# Patient Record
Sex: MALE | Race: WHITE | Employment: FULL TIME | URBAN - METROPOLITAN AREA
[De-identification: names, ages, dates, MRNs, and addresses within clinical notes are randomized per-mention and may not be internally consistent; named-entity substitution may affect disease eponyms.]

---

## 2022-04-14 ENCOUNTER — OFFICE VISIT (OUTPATIENT)
Dept: FAMILY MEDICINE CLINIC | Age: 37
End: 2022-04-14
Payer: COMMERCIAL

## 2022-04-14 VITALS
BODY MASS INDEX: 28.85 KG/M2 | HEIGHT: 70 IN | DIASTOLIC BLOOD PRESSURE: 82 MMHG | RESPIRATION RATE: 16 BRPM | HEART RATE: 80 BPM | SYSTOLIC BLOOD PRESSURE: 118 MMHG | OXYGEN SATURATION: 98 % | TEMPERATURE: 98 F | WEIGHT: 201.5 LBS

## 2022-04-14 DIAGNOSIS — R73.01 IFG (IMPAIRED FASTING GLUCOSE): ICD-10-CM

## 2022-04-14 DIAGNOSIS — Z13.220 SCREENING FOR LIPID DISORDERS: ICD-10-CM

## 2022-04-14 DIAGNOSIS — H01.02B SQUAMOUS BLEPHARITIS OF UPPER AND LOWER EYELIDS OF BOTH EYES: ICD-10-CM

## 2022-04-14 DIAGNOSIS — Z76.89 ENCOUNTER TO ESTABLISH CARE: Primary | ICD-10-CM

## 2022-04-14 DIAGNOSIS — H01.02A SQUAMOUS BLEPHARITIS OF UPPER AND LOWER EYELIDS OF BOTH EYES: ICD-10-CM

## 2022-04-14 DIAGNOSIS — M67.432 GANGLION CYST OF DORSUM OF LEFT WRIST: ICD-10-CM

## 2022-04-14 PROBLEM — G44.209 TENSION-TYPE HEADACHE, NOT INTRACTABLE: Status: ACTIVE | Noted: 2017-07-26

## 2022-04-14 PROCEDURE — 1036F TOBACCO NON-USER: CPT | Performed by: NURSE PRACTITIONER

## 2022-04-14 PROCEDURE — 99203 OFFICE O/P NEW LOW 30 MIN: CPT | Performed by: NURSE PRACTITIONER

## 2022-04-14 PROCEDURE — G8419 CALC BMI OUT NRM PARAM NOF/U: HCPCS | Performed by: NURSE PRACTITIONER

## 2022-04-14 PROCEDURE — G8427 DOCREV CUR MEDS BY ELIG CLIN: HCPCS | Performed by: NURSE PRACTITIONER

## 2022-04-14 SDOH — ECONOMIC STABILITY: FOOD INSECURITY: WITHIN THE PAST 12 MONTHS, THE FOOD YOU BOUGHT JUST DIDN'T LAST AND YOU DIDN'T HAVE MONEY TO GET MORE.: NEVER TRUE

## 2022-04-14 SDOH — ECONOMIC STABILITY: FOOD INSECURITY: WITHIN THE PAST 12 MONTHS, YOU WORRIED THAT YOUR FOOD WOULD RUN OUT BEFORE YOU GOT MONEY TO BUY MORE.: NEVER TRUE

## 2022-04-14 ASSESSMENT — PATIENT HEALTH QUESTIONNAIRE - PHQ9
SUM OF ALL RESPONSES TO PHQ QUESTIONS 1-9: 0
1. LITTLE INTEREST OR PLEASURE IN DOING THINGS: 0
2. FEELING DOWN, DEPRESSED OR HOPELESS: 0
SUM OF ALL RESPONSES TO PHQ9 QUESTIONS 1 & 2: 0

## 2022-04-14 ASSESSMENT — ENCOUNTER SYMPTOMS
TROUBLE SWALLOWING: 0
SINUS PRESSURE: 0
DIARRHEA: 0
ABDOMINAL PAIN: 0
NAUSEA: 0
SORE THROAT: 0
VOICE CHANGE: 0
BACK PAIN: 0
RHINORRHEA: 0
COLOR CHANGE: 0
WHEEZING: 0
SHORTNESS OF BREATH: 0
EYE DISCHARGE: 1
FACIAL SWELLING: 0
CONSTIPATION: 0
CHEST TIGHTNESS: 0
COUGH: 0
SINUS PAIN: 0
VOMITING: 0

## 2022-04-14 ASSESSMENT — SOCIAL DETERMINANTS OF HEALTH (SDOH): HOW HARD IS IT FOR YOU TO PAY FOR THE VERY BASICS LIKE FOOD, HOUSING, MEDICAL CARE, AND HEATING?: NOT HARD AT ALL

## 2022-04-14 NOTE — PROGRESS NOTES
NEW PATIENT PROGRESS NOTE  [facility] [address]  [address] [address]  Dept: [phone]   Chief Complaint   Patient presents with   [facility].     Has not been seen by  in 4 years: possible eye infection: crusties in both eyes when waking up: Since January when had covid: Red and crusty no itchy       ASSESSMENT/PLAN   1. Encounter to establish care  2. Squamous blepharitis of upper and lower eyelids of both eyes  Assessment & Plan:   New at this time no infection would recommend using baby shampoo and clean the upper and lower eyelids twice a day and if worsens to let me know. Wear eye protection during working hours. 3. Ganglion cyst of dorsum of left wrist  Assessment & Plan:   Asymptomatic at this time would not recommend removal.   4. Screening for lipid disorders  -     Lipid Panel; Future  5. IFG (impaired fasting glucose)  -     CBC with Auto Differential; Future  -     Comprehensive Metabolic Panel; Future  -     Hemoglobin A1C; Future           Discussed reducing caffeine intake and Discussed taking medications as directed and adverse effects    · Reach and stay at a healthy weight. This will lower your risk for many problems, such as obesity, diabetes, heart disease, and high blood pressure. · Get at least 30 minutes of exercise on most days of the week. Walking is a good choice. You also may want to do other activities, such as running, swimming, cycling, or playing tennis or team sports. · Do not smoke. Smoking can make health problems worse. If you need help quitting, talk to your doctor about stop-smoking programs and medicines. These can increase your chances of quitting for good. · Protect your skin from too much sun. When you're outdoors from 10 a.m. to 4 p.m., stay in the shade or cover up with clothing and a hat with a wide brim. Wear sunglasses that block UV rays.  Even when it's cloudy, put broad-spectrum sunscreen (SPF 30 or higher) on any exposed skin. · See a dentist one or two times a year for checkups and to have your teeth cleaned. · Wear a seat belt in the car. · Limit alcohol to 1 drink a day. Too much alcohol can cause health problems. Return in about 1 year (around 4/14/2023), or if symptoms worsen or fail to improve, for Annual exam.         I have reviewed my findings and recommendations with Tony Mares. Chad Friend, APRN - CNP, NP-C, FNP-BC        HPI:     Here to establish care was seeing Dr Jarret Acuña last visit 4 years ago    Last dental exam 6 months, last eye exam 2 weeks ago, last colonoscopy DR Moreno 3 years ago due to [place] of colon cancer in his mom in her late 42's. He had Covid pneumonia in January 2022 and he was admitted it has taken him a couple of months to recover. He is having crusty eyes in the mornings since he had Covid in January. 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 He has had problems in the past and was told to see his PCP by the eye Dr.   He would get steroid injection when the allergies were bad. He also have a cyst on the left wrist x 2 years, not painful and not getting bigger. Allergies   Allergen Reactions    Metronidazole Swelling    Pcn [Penicillins]       No current outpatient medications on file. Past Medical History:   Diagnosis Date    Allergic rhinitis     Back injury herniated disc     Chronic back pain     sciatica    COVID-19 01/2022    Intractable migraine without aura and without status migrainosus 9/28/2015      History reviewed. No pertinent surgical history.    Family History   Problem Relation Age of Onset    Leukemia Mother         Acute T Cell    Colon Cancer Mother 50    Leukemia Brother         Acute T Cell    Leukemia Nephew         Acute T Cell    COPD Father     Breast Cancer Maternal Grandmother     Heart Disease Maternal Grandmother     Breast Cancer Paternal Grandmother       Social History     Socioeconomic History  Marital status:      Spouse name: None    Number of children: None    Years of education: None    Highest education level: None   Occupational History    None   Tobacco Use    Smoking status: Former Smoker     Years: 17.00     Types: Cigarettes     Start date:      Quit date:      Years since quittin.2    Smokeless tobacco: Former User   Vaping Use    Vaping Use: Never used   Substance and Sexual Activity    Alcohol use: Yes     Comment: social    Drug use: No    Sexual activity: Yes     Partners: Female   Other Topics Concern    None   Social History Narrative    None     Social Determinants of Health     Financial Resource Strain: Low Risk     Difficulty of Paying Living Expenses: Not hard at all   Food Insecurity: No Food Insecurity    Worried About [address] in the Last Year: Never true    [address] in the Last Year: Never true   Transportation Needs:     Lack of Transportation (Medical): Not on file    Lack of Transportation (Non-Medical):  Not on file   Physical Activity:     Days of Exercise per Week: Not on file    Minutes of Exercise per Session: Not on file   Stress:     Feeling of Stress : Not on file   Social Connections:     Frequency of Communication with Friends and Family: Not on file    Frequency of Social Gatherings with Friends and Family: Not on file    Attends Taoist Services: Not on file    Active Member of [address] [place] [address] or Organizations: Not on file    Attends Club or Organization Meetings: Not on file    Marital Status: Not on file   Intimate Partner Violence:     Fear of Current or Ex-Partner: Not on file    Emotionally Abused: Not on file    Physically Abused: Not on file    Sexually Abused: Not on file   Housing Stability:     Unable to Pay for Housing in the Last Year: Not on file    Number of [place] in the Last Year: Not on file    Unstable Housing in the Last Year: Not on file         I have reviewed Norma allergies, medications, problem list, medical, social and family history and have updated as needed in the electronic medical record    Review of Systems   Constitutional: Negative for activity change, appetite change, chills, diaphoresis, fatigue, fever and unexpected weight change. HENT: Negative for congestion, dental problem, drooling, ear discharge, ear pain, facial swelling, hearing loss, mouth sores, nosebleeds, postnasal drip, rhinorrhea, sinus pressure, sinus pain, sneezing, sore throat, tinnitus, trouble swallowing and voice change. Eyes: Positive for discharge. Negative for visual disturbance. Respiratory: Negative for cough, chest tightness, shortness of breath and wheezing. Cardiovascular: Negative for chest pain, palpitations and leg swelling. Gastrointestinal: Negative for abdominal pain, constipation, diarrhea, nausea and vomiting. Endocrine: Negative for cold intolerance, heat intolerance, polydipsia, polyphagia and polyuria. Genitourinary: Negative for difficulty urinating, frequency and urgency. Musculoskeletal: Negative for arthralgias, back pain, gait problem, joint swelling, myalgias, neck pain and neck stiffness. Skin: Negative for color change, pallor, rash and wound. Allergic/Immunologic: Negative for environmental allergies, food allergies and immunocompromised state. Neurological: Negative for dizziness, tremors, seizures, syncope, facial asymmetry, speech difficulty, weakness, light-headedness, numbness and headaches. Hematological: Negative for adenopathy. Does not bruise/bleed easily. Psychiatric/Behavioral: Negative for agitation, behavioral problems, confusion, decreased concentration, dysphoric mood, hallucinations, self-injury, sleep disturbance and suicidal ideas. The patient is not nervous/anxious and is not hyperactive.         OBJECTIVE:     VS:  Wt Readings from Last 3 Encounters:   04/14/22 201 lb 8 oz (91.4 kg)                       Vitals: 04/14/22 1317   BP: 118/82   Site: Left Upper Arm   Position: Sitting   Cuff Size: Medium Adult   Pulse: 80   Resp: 16   Temp: 98 °F (36.7 °C)   TempSrc: Temporal   SpO2: 98%   Weight: 201 lb 8 oz (91.4 kg)   Height: 5' 10\" (1.778 m)       General: Alert and oriented to person, place, and time, well developed and well nourished, in no acute distress  SKIN: Warm and dry, intact without any rash, masses or lesions  HEAD: normocephalic, atraumatic  Eyes: sclera/conjunctiva clear small crust noted on the upper lid, PERRLA, EOMI's intact  ENT: tympanic membranes, external ear and ear canal normal bilaterally, normal hearing, Nose without deformity, nasal mucosa and turbinates normal without polyps   Throat: clear, tongue midline, tonsils 1+, drainage, no masses or lesions noted, good dentition  Neck: supple and non-tender without mass, trachea midline, no cervical lymphadenopathy, no bruit, no thyromegaly or nodules  Cardiovascular: regular rate and regular rhythm, normal S1 and S2, no murmurs, rubs, clicks, or gallop. Distal pulses intact, no carotid bruits. No edema  Pulmonary/Chest: clear to auscultation bilaterally, no wheezes, rales or rhonchi, normal air movement, no respiratory distress  Abdomen: soft, non-tender, non-distended, normal bowel sounds, no masses or hepatosplenomegaly  Musculoskeletal: Normal ROM, no joint swelling, deformity or tenderness   Neurologic: reflexes normal and symmetric, no cranial nerve deficit, gait, coordination and speech normal  Extremities: no clubbing, cyanosis, or edema. Psychiatric: Good eye contact, normal mood and affect, answers questions appropriately      I have reviewed my findings and recommendations with Erna Beasley.     April Kelley, APRN - CNP, NP-C, FNP-BC

## 2022-04-14 NOTE — ASSESSMENT & PLAN NOTE
New at this time no infection would recommend using baby shampoo and clean the upper and lower eyelids twice a day and if worsens to let me know. Wear eye protection during working hours.

## 2022-04-14 NOTE — PATIENT INSTRUCTIONS
seek immediate medical care if:     You have signs of infection, such as:  ? Increased pain, swelling, warmth, or redness. ? Red streaks leading from the cyst.  ? Pus draining from the cyst.  ? A fever. Watch closely for changes in your health, and be sure to contact your doctor if:     You have increasing pain.      Your ganglion is getting larger.      You still have pain or numbness from a ganglion. Where can you learn more? Go to https://Qui.ltpeInboxQeb.WePow. org and sign in to your [employer] account. Enter I299 in the Promuc box to learn more about \"Ganglions: Care Instructions. \"     If you do not have an account, please click on the \"Sign Up Now\" link. Current as of: July 1, 2021               Content Version: 13.2  © 4062-3772 Healthwise, Incorporated. Care instructions adapted under license by [facility] ([facility]). If you have questions about a medical condition or this instruction, always ask your healthcare professional. [place] [address] any warranty or liability for your use of this information.

## 2022-04-21 DIAGNOSIS — R73.01 IFG (IMPAIRED FASTING GLUCOSE): ICD-10-CM

## 2022-04-21 DIAGNOSIS — Z13.220 SCREENING FOR LIPID DISORDERS: ICD-10-CM

## 2022-04-21 LAB
ALBUMIN SERPL-MCNC: 4.7 G/DL (ref 3.5–5.2)
ALP BLD-CCNC: 54 U/L (ref 40–129)
ALT SERPL-CCNC: 23 U/L (ref 0–40)
ANION GAP SERPL CALCULATED.3IONS-SCNC: 13 MMOL/L (ref 7–16)
AST SERPL-CCNC: 23 U/L (ref 0–39)
BASOPHILS ABSOLUTE: 0.03 E9/L (ref 0–0.2)
BASOPHILS RELATIVE PERCENT: 0.6 % (ref 0–2)
BILIRUB SERPL-MCNC: 0.7 MG/DL (ref 0–1.2)
BUN BLDV-MCNC: 10 MG/DL (ref 6–20)
CALCIUM SERPL-MCNC: 9.8 MG/DL (ref 8.6–10.2)
CHLORIDE BLD-SCNC: 102 MMOL/L (ref 98–107)
CHOLESTEROL, TOTAL: 190 MG/DL (ref 0–199)
CO2: 27 MMOL/L (ref 22–29)
CREAT SERPL-MCNC: 0.9 MG/DL (ref 0.7–1.2)
EOSINOPHILS ABSOLUTE: 0.25 E9/L (ref 0.05–0.5)
EOSINOPHILS RELATIVE PERCENT: 5.3 % (ref 0–6)
GFR AFRICAN AMERICAN: >60
GFR NON-AFRICAN AMERICAN: >60 ML/MIN/1.73
GLUCOSE BLD-MCNC: 87 MG/DL (ref 74–99)
HBA1C MFR BLD: 4.9 % (ref 4–5.6)
HCT VFR BLD CALC: 49.4 % (ref 37–54)
HDLC SERPL-MCNC: 53 MG/DL
HEMOGLOBIN: 17.2 G/DL (ref 12.5–16.5)
IMMATURE GRANULOCYTES #: 0.01 E9/L
IMMATURE GRANULOCYTES %: 0.2 % (ref 0–5)
LDL CHOLESTEROL CALCULATED: 121 MG/DL (ref 0–99)
LYMPHOCYTES ABSOLUTE: 1.24 E9/L (ref 1.5–4)
LYMPHOCYTES RELATIVE PERCENT: 26.3 % (ref 20–42)
MCH RBC QN AUTO: 30.8 PG (ref 26–35)
MCHC RBC AUTO-ENTMCNC: 34.8 % (ref 32–34.5)
MCV RBC AUTO: 88.5 FL (ref 80–99.9)
MONOCYTES ABSOLUTE: 0.45 E9/L (ref 0.1–0.95)
MONOCYTES RELATIVE PERCENT: 9.6 % (ref 2–12)
NEUTROPHILS ABSOLUTE: 2.73 E9/L (ref 1.8–7.3)
NEUTROPHILS RELATIVE PERCENT: 58 % (ref 43–80)
PDW BLD-RTO: 12.7 FL (ref 11.5–15)
PLATELET # BLD: 258 E9/L (ref 130–450)
PMV BLD AUTO: 9.6 FL (ref 7–12)
POTASSIUM SERPL-SCNC: 4.7 MMOL/L (ref 3.5–5)
RBC # BLD: 5.58 E12/L (ref 3.8–5.8)
SODIUM BLD-SCNC: 142 MMOL/L (ref 132–146)
TOTAL PROTEIN: 7.5 G/DL (ref 6.4–8.3)
TRIGL SERPL-MCNC: 82 MG/DL (ref 0–149)
VLDLC SERPL CALC-MCNC: 16 MG/DL
WBC # BLD: 4.7 E9/L (ref 4.5–11.5)

## 2022-04-22 DIAGNOSIS — R79.89 ABNORMAL CBC: Primary | ICD-10-CM

## 2022-06-30 PROBLEM — Z98.890 HX OF COLONOSCOPY: Status: ACTIVE | Noted: 2022-06-30

## 2022-10-17 DIAGNOSIS — R79.89 ABNORMAL CBC: ICD-10-CM

## 2022-10-17 LAB
BASOPHILS ABSOLUTE: 0.02 E9/L (ref 0–0.2)
BASOPHILS RELATIVE PERCENT: 0.4 % (ref 0–2)
EOSINOPHILS ABSOLUTE: 0.24 E9/L (ref 0.05–0.5)
EOSINOPHILS RELATIVE PERCENT: 4.6 % (ref 0–6)
HCT VFR BLD CALC: 48.4 % (ref 37–54)
HEMOGLOBIN: 16.8 G/DL (ref 12.5–16.5)
IMMATURE GRANULOCYTES #: 0 E9/L
IMMATURE GRANULOCYTES %: 0 % (ref 0–5)
LYMPHOCYTES ABSOLUTE: 1.07 E9/L (ref 1.5–4)
LYMPHOCYTES RELATIVE PERCENT: 20.5 % (ref 20–42)
MCH RBC QN AUTO: 31.1 PG (ref 26–35)
MCHC RBC AUTO-ENTMCNC: 34.7 % (ref 32–34.5)
MCV RBC AUTO: 89.5 FL (ref 80–99.9)
MONOCYTES ABSOLUTE: 0.52 E9/L (ref 0.1–0.95)
MONOCYTES RELATIVE PERCENT: 10 % (ref 2–12)
NEUTROPHILS ABSOLUTE: 3.36 E9/L (ref 1.8–7.3)
NEUTROPHILS RELATIVE PERCENT: 64.5 % (ref 43–80)
PDW BLD-RTO: 12.5 FL (ref 11.5–15)
PLATELET # BLD: 261 E9/L (ref 130–450)
PMV BLD AUTO: 9.9 FL (ref 7–12)
RBC # BLD: 5.41 E12/L (ref 3.8–5.8)
WBC # BLD: 5.2 E9/L (ref 4.5–11.5)

## 2023-04-11 DIAGNOSIS — R73.01 IFG (IMPAIRED FASTING GLUCOSE): ICD-10-CM

## 2023-04-11 DIAGNOSIS — Z13.220 SCREENING FOR LIPID DISORDERS: ICD-10-CM

## 2023-04-11 LAB
ALBUMIN SERPL-MCNC: 4.5 G/DL (ref 3.5–5.2)
ALP SERPL-CCNC: 65 U/L (ref 40–129)
ALT SERPL-CCNC: 23 U/L (ref 0–40)
ANION GAP SERPL CALCULATED.3IONS-SCNC: 16 MMOL/L (ref 7–16)
AST SERPL-CCNC: 26 U/L (ref 0–39)
BASOPHILS # BLD: 0.03 E9/L (ref 0–0.2)
BASOPHILS NFR BLD: 0.6 % (ref 0–2)
BILIRUB SERPL-MCNC: 0.7 MG/DL (ref 0–1.2)
BUN SERPL-MCNC: 8 MG/DL (ref 6–20)
CALCIUM SERPL-MCNC: 10.1 MG/DL (ref 8.6–10.2)
CHLORIDE SERPL-SCNC: 104 MMOL/L (ref 98–107)
CHOLESTEROL, TOTAL: 226 MG/DL (ref 0–199)
CO2 SERPL-SCNC: 25 MMOL/L (ref 22–29)
CREAT SERPL-MCNC: 0.9 MG/DL (ref 0.7–1.2)
EOSINOPHIL # BLD: 0.23 E9/L (ref 0.05–0.5)
EOSINOPHIL NFR BLD: 4.7 % (ref 0–6)
ERYTHROCYTE [DISTWIDTH] IN BLOOD BY AUTOMATED COUNT: 13.2 FL (ref 11.5–15)
GLUCOSE SERPL-MCNC: 87 MG/DL (ref 74–99)
HBA1C MFR BLD: 5.2 % (ref 4–5.6)
HCT VFR BLD AUTO: 51.1 % (ref 37–54)
HDLC SERPL-MCNC: 55 MG/DL
HGB BLD-MCNC: 16.9 G/DL (ref 12.5–16.5)
IMM GRANULOCYTES # BLD: 0.01 E9/L
IMM GRANULOCYTES NFR BLD: 0.2 % (ref 0–5)
LDLC SERPL CALC-MCNC: 151 MG/DL (ref 0–99)
LYMPHOCYTES # BLD: 1 E9/L (ref 1.5–4)
LYMPHOCYTES NFR BLD: 20.6 % (ref 20–42)
MCH RBC QN AUTO: 29.8 PG (ref 26–35)
MCHC RBC AUTO-ENTMCNC: 33.1 % (ref 32–34.5)
MCV RBC AUTO: 90.1 FL (ref 80–99.9)
MONOCYTES # BLD: 0.53 E9/L (ref 0.1–0.95)
MONOCYTES NFR BLD: 10.9 % (ref 2–12)
NEUTROPHILS # BLD: 3.06 E9/L (ref 1.8–7.3)
NEUTS SEG NFR BLD: 63 % (ref 43–80)
PLATELET # BLD AUTO: 299 E9/L (ref 130–450)
PMV BLD AUTO: 9.9 FL (ref 7–12)
POTASSIUM SERPL-SCNC: 4.6 MMOL/L (ref 3.5–5)
PROT SERPL-MCNC: 7.8 G/DL (ref 6.4–8.3)
RBC # BLD AUTO: 5.67 E12/L (ref 3.8–5.8)
SODIUM SERPL-SCNC: 145 MMOL/L (ref 132–146)
TRIGL SERPL-MCNC: 102 MG/DL (ref 0–149)
VLDLC SERPL CALC-MCNC: 20 MG/DL
WBC # BLD: 4.9 E9/L (ref 4.5–11.5)

## 2023-04-17 ENCOUNTER — OFFICE VISIT (OUTPATIENT)
Dept: FAMILY MEDICINE CLINIC | Age: 38
End: 2023-04-17
Payer: COMMERCIAL

## 2023-04-17 VITALS
DIASTOLIC BLOOD PRESSURE: 68 MMHG | HEIGHT: 70 IN | TEMPERATURE: 97.4 F | RESPIRATION RATE: 16 BRPM | HEART RATE: 71 BPM | OXYGEN SATURATION: 94 % | WEIGHT: 201.5 LBS | BODY MASS INDEX: 28.85 KG/M2 | SYSTOLIC BLOOD PRESSURE: 122 MMHG

## 2023-04-17 DIAGNOSIS — E78.00 PURE HYPERCHOLESTEROLEMIA: ICD-10-CM

## 2023-04-17 DIAGNOSIS — Z00.00 ENCOUNTER FOR WELL ADULT EXAM WITHOUT ABNORMAL FINDINGS: Primary | ICD-10-CM

## 2023-04-17 PROCEDURE — 99395 PREV VISIT EST AGE 18-39: CPT | Performed by: NURSE PRACTITIONER

## 2023-04-17 RX ORDER — ATORVASTATIN CALCIUM 20 MG/1
20 TABLET, FILM COATED ORAL NIGHTLY
Qty: 30 TABLET | Refills: 5 | Status: SHIPPED | OUTPATIENT
Start: 2023-04-17

## 2023-04-17 SDOH — ECONOMIC STABILITY: FOOD INSECURITY: WITHIN THE PAST 12 MONTHS, YOU WORRIED THAT YOUR FOOD WOULD RUN OUT BEFORE YOU GOT MONEY TO BUY MORE.: NEVER TRUE

## 2023-04-17 SDOH — ECONOMIC STABILITY: FOOD INSECURITY: WITHIN THE PAST 12 MONTHS, THE FOOD YOU BOUGHT JUST DIDN'T LAST AND YOU DIDN'T HAVE MONEY TO GET MORE.: NEVER TRUE

## 2023-04-17 SDOH — ECONOMIC STABILITY: INCOME INSECURITY: HOW HARD IS IT FOR YOU TO PAY FOR THE VERY BASICS LIKE FOOD, HOUSING, MEDICAL CARE, AND HEATING?: NOT HARD AT ALL

## 2023-04-17 SDOH — ECONOMIC STABILITY: HOUSING INSECURITY
IN THE LAST 12 MONTHS, WAS THERE A TIME WHEN YOU DID NOT HAVE A STEADY PLACE TO SLEEP OR SLEPT IN A SHELTER (INCLUDING NOW)?: NO

## 2023-04-17 ASSESSMENT — ENCOUNTER SYMPTOMS
WHEEZING: 0
VOICE CHANGE: 0
CHEST TIGHTNESS: 0
FACIAL SWELLING: 0
SORE THROAT: 0
TROUBLE SWALLOWING: 0
DIARRHEA: 0
RHINORRHEA: 0
ABDOMINAL PAIN: 0
NAUSEA: 0
SINUS PRESSURE: 0
COUGH: 0
VOMITING: 0
CONSTIPATION: 0
SINUS PAIN: 0
COLOR CHANGE: 0
SHORTNESS OF BREATH: 0
BACK PAIN: 0

## 2023-04-17 ASSESSMENT — PATIENT HEALTH QUESTIONNAIRE - PHQ9
2. FEELING DOWN, DEPRESSED OR HOPELESS: 0
1. LITTLE INTEREST OR PLEASURE IN DOING THINGS: 0
SUM OF ALL RESPONSES TO PHQ QUESTIONS 1-9: 0
SUM OF ALL RESPONSES TO PHQ QUESTIONS 1-9: 0
SUM OF ALL RESPONSES TO PHQ9 QUESTIONS 1 & 2: 0
SUM OF ALL RESPONSES TO PHQ QUESTIONS 1-9: 0
SUM OF ALL RESPONSES TO PHQ QUESTIONS 1-9: 0

## 2023-04-17 NOTE — PATIENT INSTRUCTIONS
The medication list included in this document is our record of what you are currently taking, including any changes that were made at today's visit. If you find any differences when compared to your medications at home, or have any questions that were not answered at your visit, please contact the office. Well Visit, Ages 25 to 72: Care Instructions  Well visits can help you stay healthy. Your doctor has checked your overall health and may have suggested ways to take good care of yourself. Your doctor also may have recommended tests. You can help prevent illness with healthy eating, good sleep, vaccinations, regular exercise, and other steps. Get the tests that you and your doctor decide on. Depending on your age and risks, examples might include screening for diabetes; hepatitis C; HIV; and cervical, breast, lung, and colon cancer. Screening helps find diseases before any symptoms appear. Eat healthy foods. Choose fruits, vegetables, whole grains, lean protein, and low-fat dairy foods. Limit saturated fat and reduce salt. Limit alcohol. Men should have no more than 2 drinks a day. Women should have no more than 1. For some people, no alcohol is the best choice. Exercise. Get at least 30 minutes of exercise on most days of the week. Walking can be a good choice. Reach and stay at your healthy weight. This will lower your risk for many health problems. Take care of your mental health. Try to stay connected with friends, family, and community, and find ways to manage stress. If you're feeling depressed or hopeless, talk to someone. A counselor can help. If you don't have a counselor, talk to your doctor. Talk to your doctor if you think you may have a problem with alcohol or drug use. This includes prescription medicines and illegal drugs. Avoid tobacco and nicotine: Don't smoke, vape, or chew. If you need help quitting, talk to your doctor. Practice safer sex.  Getting tested, using

## 2023-04-17 NOTE — PROGRESS NOTES
Hep B, RECOMBIVAX-HB, (age 20y+), IM, 1mL 07/18/2006    MMR, PRIORIX, M-M-R II, (age 12m+), SC, 0.5mL 08/17/1999    Measles 08/25/1988    Mumps 09/01/1988    Polio OPV 08/07/1987, 08/04/1988, 12/06/1988, 09/14/1989, 04/17/1991    Rubella 09/15/1988    TDaP, ADACEL (age 10y-63y), BOOSTRIX (age 10y+), IM, 0.5mL 07/01/2008        Health Maintenance   Topic Date Due    COVID-19 Vaccine (1) Never done    DTaP/Tdap/Td vaccine (7 - Td or Tdap) 07/01/2018    Depression Screen  04/14/2023    Flu vaccine (Season Ended) 08/01/2023    Colorectal Cancer Screen  02/08/2024    A1C test (Diabetic or Prediabetic)  04/11/2024    Hepatitis A vaccine  Aged Out    Hib vaccine  Aged Out    Meningococcal (ACWY) vaccine  Aged Out    Pneumococcal 0-64 years Vaccine  Aged Out    Hepatitis B vaccine  Discontinued    Varicella vaccine  Discontinued    Diabetes screen  Discontinued    Hepatitis C screen  Discontinued    HIV screen  Discontinued     Recommendations for Preventive Services Due: see orders and patient instructions/AVS.    Return in 3 months (on 7/17/2023) for hyperlipidemia, JEANETTE.

## 2023-07-12 DIAGNOSIS — E78.00 PURE HYPERCHOLESTEROLEMIA: ICD-10-CM

## 2023-07-12 LAB
ALBUMIN SERPL-MCNC: 4.7 G/DL (ref 3.5–5.2)
ALP SERPL-CCNC: 54 U/L (ref 40–129)
ALT SERPL-CCNC: 13 U/L (ref 0–40)
ANION GAP SERPL CALCULATED.3IONS-SCNC: 8 MMOL/L (ref 7–16)
AST SERPL-CCNC: 16 U/L (ref 0–39)
BILIRUB SERPL-MCNC: 0.9 MG/DL (ref 0–1.2)
BUN SERPL-MCNC: 9 MG/DL (ref 6–20)
CALCIUM SERPL-MCNC: 10.1 MG/DL (ref 8.6–10.2)
CHLORIDE SERPL-SCNC: 100 MMOL/L (ref 98–107)
CHOLESTEROL, TOTAL: 171 MG/DL (ref 0–199)
CO2 SERPL-SCNC: 30 MMOL/L (ref 22–29)
CREAT SERPL-MCNC: 1 MG/DL (ref 0.7–1.2)
GLUCOSE SERPL-MCNC: 83 MG/DL (ref 74–99)
HDLC SERPL-MCNC: 50 MG/DL
LDLC SERPL CALC-MCNC: 103 MG/DL (ref 0–99)
POTASSIUM SERPL-SCNC: 3.8 MMOL/L (ref 3.5–5)
PROT SERPL-MCNC: 7.3 G/DL (ref 6.4–8.3)
SODIUM SERPL-SCNC: 138 MMOL/L (ref 132–146)
TRIGL SERPL-MCNC: 88 MG/DL (ref 0–149)
VLDLC SERPL CALC-MCNC: 18 MG/DL

## 2023-07-17 ENCOUNTER — OFFICE VISIT (OUTPATIENT)
Dept: FAMILY MEDICINE CLINIC | Age: 38
End: 2023-07-17
Payer: COMMERCIAL

## 2023-07-17 VITALS
SYSTOLIC BLOOD PRESSURE: 122 MMHG | BODY MASS INDEX: 27.2 KG/M2 | DIASTOLIC BLOOD PRESSURE: 68 MMHG | WEIGHT: 190 LBS | RESPIRATION RATE: 16 BRPM | OXYGEN SATURATION: 97 % | HEIGHT: 70 IN | HEART RATE: 85 BPM | TEMPERATURE: 97.5 F

## 2023-07-17 DIAGNOSIS — E78.5 BORDERLINE HYPERLIPIDEMIA: Primary | ICD-10-CM

## 2023-07-17 DIAGNOSIS — R79.89 ABNORMAL COMPLETE BLOOD COUNT: ICD-10-CM

## 2023-07-17 DIAGNOSIS — R06.83 SNORING: ICD-10-CM

## 2023-07-17 PROCEDURE — G8427 DOCREV CUR MEDS BY ELIG CLIN: HCPCS | Performed by: NURSE PRACTITIONER

## 2023-07-17 PROCEDURE — 1036F TOBACCO NON-USER: CPT | Performed by: NURSE PRACTITIONER

## 2023-07-17 PROCEDURE — 99214 OFFICE O/P EST MOD 30 MIN: CPT | Performed by: NURSE PRACTITIONER

## 2023-07-17 PROCEDURE — G8419 CALC BMI OUT NRM PARAM NOF/U: HCPCS | Performed by: NURSE PRACTITIONER

## 2023-07-17 ASSESSMENT — ENCOUNTER SYMPTOMS
CONSTIPATION: 0
COLOR CHANGE: 0
COUGH: 0
RHINORRHEA: 0
NAUSEA: 0
BACK PAIN: 0
WHEEZING: 0
SHORTNESS OF BREATH: 0
SINUS PRESSURE: 0
SORE THROAT: 0
DIARRHEA: 0
VOICE CHANGE: 0
SINUS PAIN: 0
CHEST TIGHTNESS: 0
FACIAL SWELLING: 0
VOMITING: 0
ABDOMINAL PAIN: 0
TROUBLE SWALLOWING: 0

## 2023-07-17 NOTE — ASSESSMENT & PLAN NOTE
well controlled, he changed his diet completely and has lost between 10 - 15 pounds labs reviewed Lipid, CMP,    -Discussed low fat diet, limit fast food, goodies, breads and pastas if consuming several days a week,  limit alcohol consumption as this combined with statin can cause liver problems.  -Discussed weight reduction and exercise 30 minutes 5 days a week for total of 150 minutes weekly.  -Discussed CoQ10 as directed  -Discussed if any unusual muscle aching/pain to contact the office, discussed medication and risk of muscle pain/damage from Rhabdomyolysis.

## 2023-07-17 NOTE — PROGRESS NOTES
OFFICE PROGRESS NOTE  [facility]  [address] [address]  Dept: [phone]   Chief Complaint   Patient presents with    Hyperlipidemia    Sleep Apnea       ASSESSMENT/PLAN   1. Borderline hyperlipidemia  Assessment & Plan:   well controlled, he changed his diet completely and has lost between 10 - 15 pounds labs reviewed Lipid, CMP,    -Discussed low fat diet, limit fast food, goodies, breads and pastas if consuming several days a week,  limit alcohol consumption as this combined with statin can cause liver problems.  -Discussed weight reduction and exercise 30 minutes 5 days a week for total of 150 minutes weekly.  -Discussed CoQ10 as directed  -Discussed if any unusual muscle aching/pain to contact the office, discussed medication and risk of muscle pain/damage from Rhabdomyolysis. 2. Snoring  Assessment & Plan:   Worsening check sleep study, as his hemoglobin continues to rise he doesn't smoke,   Orders:  -     Home Sleep Study; Future  -     Ambulatory referral to Sleep Medicine  3. Abnormal complete blood count  Assessment & Plan:   Worsening check sleep study, as his hemoglobin continues to rise he doesn't smoke,  Orders:  -     Home Sleep Study; Future  -     Ambulatory referral to Sleep Medicine       Reviewed labs: CMP,  Lipids  Reviewed notes from   Reviewed radiology     Discussed reducing caffeine intake, Discussed exercising 30 minutes daily, and Discussed taking medications as directed and adverse effects    Return in about 6 months (around 1/17/2024) for Annual exam.     HPI:   Hyperlipidemia: Patient presents with hyperlipidemia. He was tested because new patient and hyperlipidemia, he changed his diet completely and has lost 10 - 15 pounds he picked up the atorvastatin but never took it. His last labs 7/12/23 on no medications showed Total cholesterol of 171, HDL 50, ,  Triglycerides 88.  Denies chest pain, dyspnea,

## 2023-07-21 ENCOUNTER — TELEPHONE (OUTPATIENT)
Dept: SLEEP CENTER | Age: 38
End: 2023-07-21

## 2023-08-04 ENCOUNTER — HOSPITAL ENCOUNTER (OUTPATIENT)
Dept: SLEEP CENTER | Age: 38
Discharge: HOME | End: 2023-08-04
Payer: COMMERCIAL

## 2023-08-04 DIAGNOSIS — R79.89 ABNORMAL COMPLETE BLOOD COUNT: ICD-10-CM

## 2023-08-04 DIAGNOSIS — R06.83 SNORING: ICD-10-CM

## 2023-08-04 PROCEDURE — 95800 SLP STDY UNATTENDED: CPT

## 2023-08-09 ENCOUNTER — TELEPHONE (OUTPATIENT)
Dept: SLEEP CENTER | Age: 38
End: 2023-08-09

## 2023-08-09 NOTE — TELEPHONE ENCOUNTER
Pt returned call discussed SS results.  Pt will keep current scheduled appt in December to speak to physician re:results

## 2023-12-12 ENCOUNTER — OFFICE VISIT (OUTPATIENT)
Dept: SLEEP CENTER | Age: 38
End: 2023-12-12
Payer: COMMERCIAL

## 2023-12-12 VITALS
RESPIRATION RATE: 17 BRPM | BODY MASS INDEX: 28.12 KG/M2 | HEIGHT: 70 IN | DIASTOLIC BLOOD PRESSURE: 86 MMHG | OXYGEN SATURATION: 95 % | WEIGHT: 196.43 LBS | SYSTOLIC BLOOD PRESSURE: 134 MMHG | HEART RATE: 71 BPM

## 2023-12-12 DIAGNOSIS — G47.39 POSITIONAL SLEEP APNEA: Primary | ICD-10-CM

## 2023-12-12 DIAGNOSIS — D75.1 POLYCYTHEMIA: ICD-10-CM

## 2023-12-12 PROCEDURE — G8484 FLU IMMUNIZE NO ADMIN: HCPCS | Performed by: INTERNAL MEDICINE

## 2023-12-12 PROCEDURE — G8419 CALC BMI OUT NRM PARAM NOF/U: HCPCS | Performed by: INTERNAL MEDICINE

## 2023-12-12 PROCEDURE — 1036F TOBACCO NON-USER: CPT | Performed by: INTERNAL MEDICINE

## 2023-12-12 PROCEDURE — 99204 OFFICE O/P NEW MOD 45 MIN: CPT | Performed by: INTERNAL MEDICINE

## 2023-12-12 PROCEDURE — G8428 CUR MEDS NOT DOCUMENT: HCPCS | Performed by: INTERNAL MEDICINE

## 2023-12-12 ASSESSMENT — SLEEP AND FATIGUE QUESTIONNAIRES
HOW LIKELY ARE YOU TO NOD OFF OR FALL ASLEEP WHILE SITTING INACTIVE IN A PUBLIC PLACE: 0
HOW LIKELY ARE YOU TO NOD OFF OR FALL ASLEEP WHILE LYING DOWN TO REST IN THE AFTERNOON WHEN CIRCUMSTANCES PERMIT: 2
HOW LIKELY ARE YOU TO NOD OFF OR FALL ASLEEP WHILE SITTING AND READING: 1
HOW LIKELY ARE YOU TO NOD OFF OR FALL ASLEEP WHEN YOU ARE A PASSENGER IN A CAR FOR AN HOUR WITHOUT A BREAK: 0
ESS TOTAL SCORE: 4
HOW LIKELY ARE YOU TO NOD OFF OR FALL ASLEEP WHILE WATCHING TV: 1
HOW LIKELY ARE YOU TO NOD OFF OR FALL ASLEEP IN A CAR, WHILE STOPPED FOR A FEW MINUTES IN TRAFFIC: 0
HOW LIKELY ARE YOU TO NOD OFF OR FALL ASLEEP WHILE SITTING QUIETLY AFTER LUNCH WITHOUT ALCOHOL: 0
HOW LIKELY ARE YOU TO NOD OFF OR FALL ASLEEP WHILE SITTING AND TALKING TO SOMEONE: 0

## 2023-12-12 NOTE — PROGRESS NOTES
[facility] Sleep Medicine    Patient Name: Travis Coles  Age: 41 y.o.   : 1985    Date of Visit: 23        HPI   Travis Coles is a 41 y.o. male with  has a past medical history of Allergic rhinitis, Back injury (herniated disc ), Borderline hyperlipidemia (2023), Chronic back pain, COVID-19 (2022), colonoscopy (2022), and Intractable migraine without aura and without status migrainosus (2015). who presents as a new patient to Sleep Clinic, referred by Dr. Kasandra Alston, for Sleep Apnea (Sleep study 23 c/o snoring)  . 2023    12:57 PM 2023    12:01 PM   Sleep Medicine   Sitting and reading 1    Watching TV 1    Sitting, inactive in a public place (e.g. a theatre or a meeting) 0    As a passenger in a car for an hour without a break 0    Lying down to rest in the afternoon when circumstances permit 2    Sitting and talking to someone 0    Sitting quietly after a lunch without alcohol 0    In a car, while stopped for a few minutes in traffic 0    [place] Sleepiness Score 4    Neck circumference (Inches)  16          Bed time:  Sleep latency:   Arousals during the night:   Difficulty returning to sleep:  Wake time:  Wakes non refreshed:  Morning headaches:  Wakes with dry mouth:  Heartburn during sleep:  Naps:  Falling asleep inappropriately/car accidents due to sleepiness:  Caffeine:  Nicotine:  EtOH:  Sleep aids:  RLS:   RBD:   Hypnagogic/hypnopompic hallucinations:   Sleep paralysis:   Sleep walking/talking/eating:   Cataplexy:       PMH:  Past Medical History:   Diagnosis Date    Allergic rhinitis     Back injury herniated disc     Borderline hyperlipidemia 2023    Chronic back pain     sciatica    COVID-19 2022    Hx of colonoscopy 2022 DR Moreno colonoscopy repeat in 5 years due  normal but had colitis, rectal bleeding and diarrhea.  FH cancer    Intractable migraine without aura and without status migrainosus 2015

## 2024-01-22 ENCOUNTER — OFFICE VISIT (OUTPATIENT)
Dept: FAMILY MEDICINE CLINIC | Age: 39
End: 2024-01-22
Payer: COMMERCIAL

## 2024-01-22 VITALS
OXYGEN SATURATION: 98 % | DIASTOLIC BLOOD PRESSURE: 66 MMHG | RESPIRATION RATE: 16 BRPM | WEIGHT: 195 LBS | BODY MASS INDEX: 27.92 KG/M2 | HEIGHT: 70 IN | SYSTOLIC BLOOD PRESSURE: 118 MMHG | TEMPERATURE: 97.7 F | HEART RATE: 60 BPM

## 2024-01-22 DIAGNOSIS — E78.5 BORDERLINE HYPERLIPIDEMIA: ICD-10-CM

## 2024-01-22 DIAGNOSIS — R79.89 ABNORMAL COMPLETE BLOOD COUNT: ICD-10-CM

## 2024-01-22 DIAGNOSIS — R73.01 IFG (IMPAIRED FASTING GLUCOSE): ICD-10-CM

## 2024-01-22 DIAGNOSIS — Z80.0 FAMILY HISTORY OF COLON CANCER REQUIRING SCREENING COLONOSCOPY: ICD-10-CM

## 2024-01-22 DIAGNOSIS — Z00.00 ENCOUNTER FOR WELL ADULT EXAM WITHOUT ABNORMAL FINDINGS: Primary | ICD-10-CM

## 2024-01-22 LAB
ABSOLUTE IMMATURE GRANULOCYTE: <0.03 K/UL (ref 0–0.58)
ALBUMIN SERPL-MCNC: 4.5 G/DL (ref 3.5–5.2)
ALP BLD-CCNC: 55 U/L (ref 40–129)
ALT SERPL-CCNC: 17 U/L (ref 0–40)
ANION GAP SERPL CALCULATED.3IONS-SCNC: 10 MMOL/L (ref 7–16)
AST SERPL-CCNC: 27 U/L (ref 0–39)
BASOPHILS ABSOLUTE: 0.03 K/UL (ref 0–0.2)
BASOPHILS RELATIVE PERCENT: 1 % (ref 0–2)
BILIRUB SERPL-MCNC: 0.8 MG/DL (ref 0–1.2)
BUN BLDV-MCNC: 12 MG/DL (ref 6–20)
CALCIUM SERPL-MCNC: 10 MG/DL (ref 8.6–10.2)
CHLORIDE BLD-SCNC: 102 MMOL/L (ref 98–107)
CHOLESTEROL: 192 MG/DL
CO2: 27 MMOL/L (ref 22–29)
CREAT SERPL-MCNC: 0.9 MG/DL (ref 0.7–1.2)
EOSINOPHILS ABSOLUTE: 0.19 K/UL (ref 0.05–0.5)
EOSINOPHILS RELATIVE PERCENT: 4 % (ref 0–6)
GFR SERPL CREATININE-BSD FRML MDRD: >60 ML/MIN/1.73M2
GLUCOSE BLD-MCNC: 88 MG/DL (ref 74–99)
HBA1C MFR BLD: 4.9 % (ref 4–5.6)
HCT VFR BLD CALC: 51 % (ref 37–54)
HDLC SERPL-MCNC: 52 MG/DL
HEMOGLOBIN: 17.2 G/DL (ref 12.5–16.5)
IMMATURE GRANULOCYTES: 0 % (ref 0–5)
LDL CHOLESTEROL: 112 MG/DL
LYMPHOCYTES ABSOLUTE: 0.99 K/UL (ref 1.5–4)
LYMPHOCYTES RELATIVE PERCENT: 19 % (ref 20–42)
MCH RBC QN AUTO: 30.2 PG (ref 26–35)
MCHC RBC AUTO-ENTMCNC: 33.7 G/DL (ref 32–34.5)
MCV RBC AUTO: 89.5 FL (ref 80–99.9)
MONOCYTES ABSOLUTE: 0.53 K/UL (ref 0.1–0.95)
MONOCYTES RELATIVE PERCENT: 10 % (ref 2–12)
NEUTROPHILS ABSOLUTE: 3.43 K/UL (ref 1.8–7.3)
NEUTROPHILS RELATIVE PERCENT: 66 % (ref 43–80)
PDW BLD-RTO: 12.6 % (ref 11.5–15)
PLATELET # BLD: 239 K/UL (ref 130–450)
PMV BLD AUTO: 10.4 FL (ref 7–12)
POTASSIUM SERPL-SCNC: 4.6 MMOL/L (ref 3.5–5)
RBC # BLD: 5.7 M/UL (ref 3.8–5.8)
SODIUM BLD-SCNC: 139 MMOL/L (ref 132–146)
TOTAL PROTEIN: 7.7 G/DL (ref 6.4–8.3)
TRIGL SERPL-MCNC: 138 MG/DL
VLDLC SERPL CALC-MCNC: 28 MG/DL
WBC # BLD: 5.2 K/UL (ref 4.5–11.5)

## 2024-01-22 PROCEDURE — G8484 FLU IMMUNIZE NO ADMIN: HCPCS | Performed by: NURSE PRACTITIONER

## 2024-01-22 PROCEDURE — 99395 PREV VISIT EST AGE 18-39: CPT | Performed by: NURSE PRACTITIONER

## 2024-01-22 ASSESSMENT — ENCOUNTER SYMPTOMS
COLOR CHANGE: 0
TROUBLE SWALLOWING: 0
RHINORRHEA: 0
VOICE CHANGE: 0
WHEEZING: 0
ABDOMINAL PAIN: 0
CHEST TIGHTNESS: 0
VOMITING: 0
BACK PAIN: 0
DIARRHEA: 0
SINUS PAIN: 0
APNEA: 1
NAUSEA: 0
SORE THROAT: 0
SHORTNESS OF BREATH: 0
SINUS PRESSURE: 0
COUGH: 0
FACIAL SWELLING: 0
CONSTIPATION: 0

## 2024-01-22 ASSESSMENT — PATIENT HEALTH QUESTIONNAIRE - PHQ9
2. FEELING DOWN, DEPRESSED OR HOPELESS: 0
1. LITTLE INTEREST OR PLEASURE IN DOING THINGS: 0
SUM OF ALL RESPONSES TO PHQ QUESTIONS 1-9: 0
SUM OF ALL RESPONSES TO PHQ9 QUESTIONS 1 & 2: 0
SUM OF ALL RESPONSES TO PHQ QUESTIONS 1-9: 0

## 2024-01-22 NOTE — PROGRESS NOTES
Mood and Affect: Mood normal.         Behavior: Behavior normal.         Thought Content: Thought content normal.         Judgment: Judgment normal.         Assessment   Plan   1. Encounter for well adult exam without abnormal findings  2. Family history of colon cancer requiring screening colonoscopy  -     Amb External Referral To Gastroenterology  3. IFG (impaired fasting glucose)  -     Hemoglobin A1C; Future  4. Borderline hyperlipidemia  -     CBC with Auto Differential; Future  -     Comprehensive Metabolic Panel; Future  -     Lipid Panel; Future  5. Abnormal complete blood count  -     CBC with Auto Differential; Future         Reach and stay at a healthy weight. This will lower your risk for many problems, such as obesity, diabetes, heart disease, and high blood pressure.  Get at least 30 minutes of exercise on most days of the week. Walking is a good choice. You also may want to do other activities, such as running, swimming, cycling, or playing tennis or team sports.  Do not smoke. Smoking can make health problems worse. If you need help quitting, talk to your doctor about stop-smoking programs and medicines. These can increase your chances of quitting for good.  Protect your skin from too much sun. When you're outdoors from 10 a.m. to 4 p.m., stay in the shade or cover up with clothing and a hat with a wide brim. Wear sunglasses that block UV rays. Even when it's cloudy, put broad-spectrum sunscreen (SPF 30 or higher) on any exposed skin.  See a dentist one or two times a year for checkups and to have your teeth cleaned.  Wear a seat belt in the car.  Limit alcohol to 1 drink a day. Too much alcohol can cause health problems.  Limit sexual partners and use protection with each sexual encounter.  Stay up to date on colonoscopy screening for colorectal cancer which now starts at age 45.   Personalized Preventive Plan   Current Health Maintenance Status  Immunization History   Administered Date(s)

## 2024-08-04 SDOH — ECONOMIC STABILITY: FOOD INSECURITY: WITHIN THE PAST 12 MONTHS, YOU WORRIED THAT YOUR FOOD WOULD RUN OUT BEFORE YOU GOT MONEY TO BUY MORE.: NEVER TRUE

## 2024-08-04 SDOH — ECONOMIC STABILITY: INCOME INSECURITY: HOW HARD IS IT FOR YOU TO PAY FOR THE VERY BASICS LIKE FOOD, HOUSING, MEDICAL CARE, AND HEATING?: NOT HARD AT ALL

## 2024-08-04 SDOH — ECONOMIC STABILITY: FOOD INSECURITY: WITHIN THE PAST 12 MONTHS, THE FOOD YOU BOUGHT JUST DIDN'T LAST AND YOU DIDN'T HAVE MONEY TO GET MORE.: NEVER TRUE

## 2024-08-04 SDOH — ECONOMIC STABILITY: TRANSPORTATION INSECURITY
IN THE PAST 12 MONTHS, HAS LACK OF TRANSPORTATION KEPT YOU FROM MEETINGS, WORK, OR FROM GETTING THINGS NEEDED FOR DAILY LIVING?: NO

## 2024-08-05 ENCOUNTER — OFFICE VISIT (OUTPATIENT)
Dept: FAMILY MEDICINE CLINIC | Age: 39
End: 2024-08-05
Payer: COMMERCIAL

## 2024-08-05 VITALS
WEIGHT: 186 LBS | DIASTOLIC BLOOD PRESSURE: 82 MMHG | RESPIRATION RATE: 18 BRPM | HEART RATE: 68 BPM | OXYGEN SATURATION: 98 % | BODY MASS INDEX: 26.63 KG/M2 | HEIGHT: 70 IN | TEMPERATURE: 97.4 F | SYSTOLIC BLOOD PRESSURE: 108 MMHG

## 2024-08-05 DIAGNOSIS — E78.5 BORDERLINE HYPERLIPIDEMIA: ICD-10-CM

## 2024-08-05 DIAGNOSIS — K21.00 GASTROESOPHAGEAL REFLUX DISEASE WITH ESOPHAGITIS WITHOUT HEMORRHAGE: Primary | ICD-10-CM

## 2024-08-05 DIAGNOSIS — R79.89 ABNORMAL COMPLETE BLOOD COUNT: ICD-10-CM

## 2024-08-05 PROBLEM — R73.01 IFG (IMPAIRED FASTING GLUCOSE): Status: RESOLVED | Noted: 2017-07-26 | Resolved: 2024-08-05

## 2024-08-05 PROCEDURE — 99214 OFFICE O/P EST MOD 30 MIN: CPT | Performed by: NURSE PRACTITIONER

## 2024-08-05 PROCEDURE — G8419 CALC BMI OUT NRM PARAM NOF/U: HCPCS | Performed by: NURSE PRACTITIONER

## 2024-08-05 PROCEDURE — 1036F TOBACCO NON-USER: CPT | Performed by: NURSE PRACTITIONER

## 2024-08-05 PROCEDURE — G8427 DOCREV CUR MEDS BY ELIG CLIN: HCPCS | Performed by: NURSE PRACTITIONER

## 2024-08-05 RX ORDER — PANTOPRAZOLE SODIUM 40 MG/1
40 TABLET, DELAYED RELEASE ORAL
Qty: 30 TABLET | Refills: 1 | Status: SHIPPED | OUTPATIENT
Start: 2024-08-05

## 2024-08-05 ASSESSMENT — ENCOUNTER SYMPTOMS
NAUSEA: 0
WHEEZING: 0
VOMITING: 1
DIARRHEA: 0
COUGH: 0
ABDOMINAL PAIN: 0
SHORTNESS OF BREATH: 0
CONSTIPATION: 0

## 2024-08-05 NOTE — ASSESSMENT & PLAN NOTE
uncontrolled, start pantoprazole 40 mg daily  -Discussed elevated the HOB 30 degrees  -Discussed limiting spicy, fatty, greasy foods  -Discussed limit caffeine consumption to 1 - 2 cups daily  -Discussed waiting at least 3 - 4 hours before going to bed after eating  -Discussed not to eat and bend over immediately or lift heavy items.  -Discussed weight reduction if needed even 5 - 10 pounds.  -Discussed taking medications 1 hour prior to eating.   -Stay well hydrated    If not improving referral to ENT for scope larynx or GI

## 2024-08-05 NOTE — ASSESSMENT & PLAN NOTE
well controlled, he changed his diet completely and has lost between 10 - 15 pounds labs reviewed Lipid, CMP,    -Discussed low fat diet, limit fast food, goodies, breads and pastas if consuming several days a week,  limit alcohol consumption as this combined with statin can cause liver problems.  -Discussed weight reduction and exercise 30 minutes 5 days a week for total of 150 minutes weekly.  -Discussed CoQ10 as directed  -Discussed if any unusual muscle aching/pain to contact the office, discussed medication and risk of muscle pain/damage from Rhabdomyolysis

## 2024-08-05 NOTE — ASSESSMENT & PLAN NOTE
Recheck CBC due to rising hemoglobin in non smoker, had sleep study done and was told he didn't need device just to not sleep on his back. If continuing to rise needs to follow up with DR Cantu as he hasn't made the appt yet as he took his son to the initial appt and was told he couldn't bring him into the office.

## 2024-08-05 NOTE — PROGRESS NOTES
.OFFICE PROGRESS NOTE  [facility] PHYSICIANS [place] [facility]  [address]  [place] [address]  Dept: [phone]   Chief Complaint   Patient presents with    Neck Pain     X1 month pt choked on popcorn kernel-unsure if related no other known injury  Worse at night       ASSESSMENT/PLAN   1. Gastroesophageal reflux disease with esophagitis without hemorrhage  Assessment & Plan:  uncontrolled, start pantoprazole 40 mg daily  -Discussed elevated the HOB 30 degrees  -Discussed limiting spicy, fatty, greasy foods  -Discussed limit caffeine consumption to 1 - 2 cups daily  -Discussed waiting at least 3 - 4 hours before going to bed after eating  -Discussed not to eat and bend over immediately or lift heavy items.  -Discussed weight reduction if needed even 5 - 10 pounds.  -Discussed taking medications 1 hour prior to eating.   -Stay well hydrated    If not improving referral to ENT for scope larynx or GI      Orders:  -     pantoprazole (PROTONIX) 40 MG tablet; Take 1 tablet by mouth every morning (before breakfast), Disp-30 tablet, R-1Normal  -     CBC with Auto Differential; Future  2. Abnormal complete blood count  Assessment & Plan:  Recheck CBC due to rising hemoglobin in non smoker, had sleep study done and was told he didn't need device just to not sleep on his back. If continuing to rise needs to follow up with DR Cantu as he hasn't made the appt yet as he took his son to the initial appt and was told he couldn't bring him into the office.   Orders:  -     CBC with Auto Differential; Future  3. Borderline hyperlipidemia  -     Lipid Panel; Future       Reviewed labs: CMP, CBCD, abnormal CBC repeat today didn't make appt with Dr. Cantu  Reviewed notes from   Reviewed radiology     Discussed taking medications as directed and adverse effects    Return in about 6 weeks (around 9/16/2024) for GERD.     HPI:   He feels like he is having pain toward the back of the tongue x 1 month, about a

## 2024-08-10 ENCOUNTER — HOSPITAL ENCOUNTER (OUTPATIENT)
Age: 39
Discharge: HOME | End: 2024-08-10
Payer: COMMERCIAL

## 2024-08-10 DIAGNOSIS — K21.00 GASTROESOPHAGEAL REFLUX DISEASE WITH ESOPHAGITIS WITHOUT HEMORRHAGE: ICD-10-CM

## 2024-08-10 DIAGNOSIS — R79.89 ABNORMAL COMPLETE BLOOD COUNT: ICD-10-CM

## 2024-08-10 DIAGNOSIS — E78.5 BORDERLINE HYPERLIPIDEMIA: ICD-10-CM

## 2024-08-10 LAB
BASOPHILS # BLD: 0.03 K/UL (ref 0–0.2)
BASOPHILS NFR BLD: 1 % (ref 0–2)
CHOLEST SERPL-MCNC: 172 MG/DL
EOSINOPHIL # BLD: 0.11 K/UL (ref 0.05–0.5)
EOSINOPHILS RELATIVE PERCENT: 3 % (ref 0–6)
ERYTHROCYTE [DISTWIDTH] IN BLOOD BY AUTOMATED COUNT: 12.3 % (ref 11.5–15)
HCT VFR BLD AUTO: 47.5 % (ref 37–54)
HDLC SERPL-MCNC: 52 MG/DL
HGB BLD-MCNC: 16.3 G/DL (ref 12.5–16.5)
IMM GRANULOCYTES # BLD AUTO: <0.03 K/UL (ref 0–0.58)
IMM GRANULOCYTES NFR BLD: 0 % (ref 0–5)
LDLC SERPL CALC-MCNC: 99 MG/DL
LYMPHOCYTES NFR BLD: 0.7 K/UL (ref 1.5–4)
LYMPHOCYTES RELATIVE PERCENT: 20 % (ref 20–42)
MCH RBC QN AUTO: 29.8 PG (ref 26–35)
MCHC RBC AUTO-ENTMCNC: 34.3 G/DL (ref 32–34.5)
MCV RBC AUTO: 86.8 FL (ref 80–99.9)
MONOCYTES NFR BLD: 0.37 K/UL (ref 0.1–0.95)
MONOCYTES NFR BLD: 11 % (ref 2–12)
NEUTROPHILS NFR BLD: 65 % (ref 43–80)
NEUTS SEG NFR BLD: 2.3 K/UL (ref 1.8–7.3)
PLATELET # BLD AUTO: 222 K/UL (ref 130–450)
PMV BLD AUTO: 9.6 FL (ref 7–12)
RBC # BLD AUTO: 5.47 M/UL (ref 3.8–5.8)
TRIGL SERPL-MCNC: 103 MG/DL
VLDLC SERPL CALC-MCNC: 21 MG/DL
WBC OTHER # BLD: 3.5 K/UL (ref 4.5–11.5)

## 2024-08-10 PROCEDURE — 85025 COMPLETE CBC W/AUTO DIFF WBC: CPT

## 2024-08-10 PROCEDURE — 36415 COLL VENOUS BLD VENIPUNCTURE: CPT

## 2024-08-10 PROCEDURE — 80061 LIPID PANEL: CPT
